# Patient Record
Sex: MALE | Race: WHITE | Employment: FULL TIME | URBAN - NONMETROPOLITAN AREA
[De-identification: names, ages, dates, MRNs, and addresses within clinical notes are randomized per-mention and may not be internally consistent; named-entity substitution may affect disease eponyms.]

---

## 2021-07-12 ENCOUNTER — HOSPITAL ENCOUNTER (OUTPATIENT)
Dept: GENERAL RADIOLOGY | Age: 56
Discharge: HOME | End: 2021-07-12
Payer: COMMERCIAL

## 2021-07-12 ENCOUNTER — HOSPITAL ENCOUNTER (OUTPATIENT)
Age: 56
Discharge: HOME | End: 2021-07-12
Payer: COMMERCIAL

## 2021-07-12 DIAGNOSIS — T14.90XA INJURY: ICD-10-CM

## 2021-07-12 DIAGNOSIS — S93.402A SPRAIN OF LEFT ANKLE, UNSPECIFIED LIGAMENT, INITIAL ENCOUNTER: ICD-10-CM

## 2021-07-12 DIAGNOSIS — S96.912A STRAIN OF LEFT ANKLE, INITIAL ENCOUNTER: ICD-10-CM

## 2021-07-12 PROCEDURE — 73630 X-RAY EXAM OF FOOT: CPT

## 2021-07-12 PROCEDURE — 73610 X-RAY EXAM OF ANKLE: CPT

## 2021-08-23 ENCOUNTER — HOSPITAL ENCOUNTER (OUTPATIENT)
Dept: PHYSICAL THERAPY | Age: 56
Setting detail: THERAPIES SERIES
Discharge: HOME | End: 2021-08-23
Payer: COMMERCIAL

## 2021-08-23 PROCEDURE — 97110 THERAPEUTIC EXERCISES: CPT

## 2021-08-23 PROCEDURE — 97161 PT EVAL LOW COMPLEX 20 MIN: CPT

## 2021-08-23 ASSESSMENT — PAIN DESCRIPTION - ORIENTATION: ORIENTATION: LEFT

## 2021-08-23 ASSESSMENT — PAIN DESCRIPTION - FREQUENCY: FREQUENCY: CONTINUOUS

## 2021-08-23 ASSESSMENT — PAIN DESCRIPTION - DESCRIPTORS: DESCRIPTORS: ACHING

## 2021-08-23 ASSESSMENT — PAIN DESCRIPTION - LOCATION: LOCATION: ANKLE

## 2021-08-23 ASSESSMENT — PAIN SCALES - GENERAL: PAINLEVEL_OUTOF10: 3

## 2021-08-23 ASSESSMENT — PAIN DESCRIPTION - ONSET: ONSET: SUDDEN

## 2021-08-23 ASSESSMENT — PAIN - FUNCTIONAL ASSESSMENT: PAIN_FUNCTIONAL_ASSESSMENT: PREVENTS OR INTERFERES SOME ACTIVE ACTIVITIES AND ADLS

## 2021-08-23 ASSESSMENT — PAIN DESCRIPTION - PROGRESSION: CLINICAL_PROGRESSION: GRADUALLY IMPROVING

## 2021-08-23 ASSESSMENT — PAIN DESCRIPTION - PAIN TYPE: TYPE: ACUTE PAIN

## 2021-08-23 NOTE — PROGRESS NOTES
Physical Therapy  Initial Assessment  Date: 2021  Patient Name: Tete Holman  MRN: [id number]  : 1965     Treatment Diagnosis: S93.402A/S96.912 A    Restrictions  Position Activity Restriction  Other position/activity restrictions: none    Subjective   General  Chart Reviewed: Yes  Patient assessed for rehabilitation services?: Yes  Family / Caregiver Present: No  Referring Practitioner: Ary Trejo  Diagnosis: S93.402A/S96.912 A  Follows Commands: Within Functional Limits  PT Visit Information  PT Insurance Information: [facility] 10 sessions by 21  Subjective  Subjective: injured L ankle 21- inversion sprain- no prior ankle injuries - most difficulty with prolonged standing - has been taken off restrictions- wearing ankle brace; patient estimates that he is  little above of 50% of normal funciton  Pain Screening  Patient Currently in Pain: Yes  Pain Assessment  Pain Assessment: 0-10  Pain Level: 3 (max pain 8/10 with)  Pain Type: Acute pain  Pain Location: Ankle  Pain Orientation: Left  Pain Descriptors: Aching  Pain Frequency: Continuous  Pain Onset: Sudden  Clinical Progression: Gradually improving  Functional Pain Assessment: Prevents or interferes some active activities and ADLs  Vital Signs  Patient Currently in Pain: Yes    Vision/Hearing  Vision  Vision:  (glasses)  Hearing  Hearing: Within functional limits    Orientation  Orientation  Overall Orientation Status: Within Normal Limits    Social/Functional History  Social/Functional History  Home Layout: One level  Home Access: Level entry  ADL Assistance: Independent  Homemaking Assistance: Independent  Ambulation Assistance: Independent  Transfer Assistance: Independent  Active : Yes  Mode of Transportation: Car  Type of occupation: [employer]    Objective     Observation/Palpation  Palpation: L lateral ankle TTP    AROM LLE (degrees)  LLE AROM : WFL  LLE General AROM: ankle AROM painful    Strength LLE  Strength LLE: Exception  Comment: unable to perform single L leg heel up- resisted ankle inversion/eversion painful  L Ankle Plantar Flexion: At least;2+/5                      Ambulation  Ambulation?: Yes  Ambulation 1  Quality of Gait: antalgic gait                            Assessment   Conditions Requiring Skilled Therapeutic Intervention  Body structures, Functions, Activity limitations: Decreased ROM; Increased pain  Assessment: 10 sec TUG, 10 STS, 49/80 LEFS  Treatment Diagnosis: S93.402A/S96.912 A  Prognosis: Good  Decision Making: Low Complexity  History: np PF  Exam: 10 sec TUG, 10 STS, 49/80 LEFS  Clinical Presentation: changing characteristics of function due to pain  Barriers to Learning: none         Plan        G-Code       OutComes Score                                                  AM-PAC Score             Goals  Long term goals  Time Frame for Long term goals : 10 sessions  Long term goal 1: patiejnt will perform 10 L leg heel ups with no greater than 3/10 pain  Long term goal 2: 57/80 LEFS  Long term goal 3: patient will be indpendent with HEP       Therapy Time   Individual Concurrent Group Co-treatment   Time In 0825         Time Out 0905         Minutes 40         Timed Code Treatment Minutes: 20 Minutes       KATIUSKA Berger, PT

## 2021-08-23 NOTE — FLOWSHEET NOTE
Outpatient Physical Therapy  [place]           [] Phone: [phone]   Fax: [phone]  [place] [place]           [x] Phone: [phone]   Fax: [phone]        Physical Therapy Daily Treatment Note  Date:  2021    Patient Name:  Sujata Almanza    :  1965  MRN: [id number]  Restrictions/Precautions:  Other position/activity restrictions: none  Diagnosis:   Diagnosis: S93.402A/S96.912 A  Date of Injury/Surgery:   Treatment Diagnosis: Treatment Diagnosis: S93.402A/S96.912 A    Insurance/Certification information: PT Insurance Information: [facility] 10 sessions by 21   Referring Physician:  Referring Practitioner: Fransisca Willoughby Doctor Visit:    Plan of care signed (Y/N):    Outcome Measure: 10 sec TUG, 10 STS, 49/80 LEFS  Visit# / total visits:  1 /10  Pain level: 3/10 L ankle pain @ time of eval  Goals:           Long term goals  Time Frame for Long term goals : 10 sessions  Long term goal 1: patiejnt will perform 10 L leg heel ups with no greater than 3/10 pain  Long term goal 2: 57/80 LEFS  Long term goal 3: patient will be indpendent with HEP    ASSESSMENT  Patient primary complaints: L ankle pain  History of condition:injured L ankle 21- inversion sprain- no prior ankle injuries - has been taken off restrictions- wearing ankle brace; patient estimates that he is  little above of 50% of normal funciton  Current functional limitations: most difficulty with prolonged standing   Clinical findings:10 sec TUG, 10 STS, 49/80 LEFS; pain with L heel up  PLOF:function not limited by L ankle pain prior to injury  Skilled PT interventions are intended to: decrease pain which will enable patient  to return to PLOF  Patient agrees with established plan of care and assisted in the development of their  goals  Barriers to learning:none- no mental/cognitive barriers observed  Preferred learning style(s):   written- demonstration -practice  Preferred Language: English  Potential barriers to progress:none  The patient appears motivated to participate in PT regain PLOF: yes      Subjective:  See eval         Any changes in Ambulatory Summary Sheet? None        Objective:  See eval   COVID screening questions were asked and patient attested that there had been no contact or symptoms        Exercises: (No more than 4 columns)   Exercise/Equipment Date 8/23/21 Date Date      PT eval/ HEP instruct     WARM UP         nustep  start          TABLE      Ankle pumps  5 ct hold ea direction HEP    Calf flexibility Towel stretch HEP    ankle circles CW/CCW HEP                   STANDING      Slant board 1. 5'     Rocker board 1.5' ea - balance and FWD/BWD  taps     Shuttle-  Trial DL heel ups    BAPS board  Trial     Total  gym  trial                     PROPRIOCEPTION                                    MODALITIES                      Other Therapeutic Activities/Education:        Home Exercise Program:  Towel stretch/ankle pumps/circles patient expressed and demonstrated understanding provided with handout      Manual Treatments:        Modalities:        Communication with other providers:        Assessment:  (Response towards treatment session) (Pain Rating)  Pt tolerated  treatment without any adverse reactions or complications this date.  . Pt would continue to benefit from skilled therapy interventions to address remaining impairments, improve mobility and strength,  and progress toward goal completion and prepare for d/c including finalizing HEP ;    Pain complaints after session 3/10  L ankle      Plan for Next Session:        Time In / Time Out:0825/0905           If [facility] Please Indicate Time In/Out/Total Time  CPT Code Time in Time out Total Time   PT eval   0825  0845  20   TE  0845  0905  20                                          Total for session      40       Timed Code/Total Treatment Minutes:  TE x1/ PT eval      Next Progress Note due:        Plan of Care Interventions:  [x] Therapeutic Exercise  [] Modalities:  [x] Therapeutic Activity     [] Ultrasound  [] Estim  [x] Gait Training      [] Cervical Traction [] Lumbar Traction  [x] Neuromuscular Re-education    [] Cold/hotpack [] Iontophoresis   [x] Instruction in HEP      [x] Vasopneumatic   [] Dry Needling    [x] Manual Therapy               [] Aquatic Therapy              Electronically signed by:  Ciarra Christensen PT, 8/23/2021, 9:31 AM

## 2021-08-23 NOTE — PLAN OF CARE
Outpatient Physical Therapy           [place]           [] Phone: [phone]   Fax: [phone]  [place]           [x] Phone: [phone]   Fax: [phone]     To: Referring Practitioner: Adelso Faustin    From: Murray Gilliam PT, PT     Patient: Landen Carrasco       : 1965  Diagnosis: Diagnosis: S93.402A/S96.912 A   Treatment Diagnosis: Treatment Diagnosis: S93.402A/S96.912 A   Date: 2021    Physical Therapy Certification/Re-Certification Form    The following patient has been evaluated for physical therapy services and for therapy to continue, insurance requires physician review of the treatment plan initially and every 90 days. Please review the attached evaluation and/or summary of the patient's plan of care, and verify that you agree therapy should continue by signing the attached document and sending it back to our office.     Patient primary complaints: L ankle pain  History of condition:injured L ankle 21- inversion sprain- no prior ankle injuries - has been taken off restrictions- wearing ankle brace; patient estimates that he is  little above of 50% of normal funciton  Current functional limitations: most difficulty with prolonged standing   Clinical findings:10 sec TUG, 10 STS, 49/80 LEFS; pain with L heel up  PLOF:function not limited by L ankle pain prior to injury  Skilled PT interventions are intended to: decrease pain which will enable patient  to return to PLOF  Patient agrees with established plan of care and assisted in the development of their  goals  Barriers to learning:none- no mental/cognitive barriers observed  Preferred learning style(s):   written- demonstration -practice  Preferred Language: English  Potential barriers to progress:none  The patient appears motivated to participate in PT regain PLOF: yes    Plan of Care/Treatment to date:  [x] Therapeutic Exercise  [] Modalities:  [x] Therapeutic Activity     [] Ultrasound  [] Electrical Stimulation  [x] Gait Training      [] Cervical Traction [] Lumbar Traction  [x] Neuromuscular Re-education    [] Cold/hotpack [] Iontophoresis   [x] Instruction in HEP      [x] Vasopneumatic    [] Dry Needling  [x] Manual Therapy               [] Aquatic Therapy     Frequency/Duration:  # Days per week: [] 1 day # Weeks: [x] 10 sessions [] 5 weeks     [x] 2 days   [] 2 weeks [] 6 weeks     [x] 3 days   [] 3 weeks [] 7 weeks     [] 4 days   [] 4 weeks [] 8 weeks         [] 9 weeks [] 10 weeks         [] 11 weeks [] 12 weeks  Rehab Potential/Progress: [] Excellent [x] Good [] Fair  [] Poor   Goals:          Long term goals  Time Frame for Long term goals : 10 sessions  Long term goal 1: patiejnt will perform 10 L leg heel ups with no greater than 3/10 pain  Long term goal 2: 57/80 LEFS  Long term goal 3: patient will be indpendent with HEP  Electronically signed by:  Renard Griffin PT, PT, 8/23/2021, 9:31 AM  If you have any questions or concerns, please don't hesitate to call.   Thank you for your referral.      Physician Signature:________________________________Date:_________ TIME: _____  By signing above, therapists plan is approved by physician

## 2021-08-25 ENCOUNTER — HOSPITAL ENCOUNTER (OUTPATIENT)
Dept: PHYSICAL THERAPY | Age: 56
Setting detail: THERAPIES SERIES
Discharge: HOME | End: 2021-08-25
Payer: COMMERCIAL

## 2021-08-25 PROCEDURE — 97016 VASOPNEUMATIC DEVICE THERAPY: CPT

## 2021-08-25 PROCEDURE — 97110 THERAPEUTIC EXERCISES: CPT

## 2021-08-25 NOTE — FLOWSHEET NOTE
Outpatient Physical Therapy  [place]           [] Phone: [phone]   Fax: [phone]  [facility]           [x] Phone: [phone]   Fax: [phone]        Physical Therapy Daily Treatment Note  Date:  2021    Patient Name:  Booker Solis    :  1965  MRN: [id number]  Restrictions/Precautions:  Other position/activity restrictions: none  Diagnosis:   Diagnosis: S93.402A/S96.912 A  Date of Injury/Surgery:   Treatment Diagnosis: Treatment Diagnosis: S93.402A/S96.912 A    Insurance/Certification information: PT Insurance Information: [facility] 10 sessions by 21   Referring Physician:  Referring Practitioner: Kelly Martines  Next Doctor Visit:    Plan of care signed (Y/N):    Outcome Measure: 10 sec TUG, 10 STS, 49/80 LEFS  Visit# / total visits:  2 /10  Pain level: 5/10 L ankle pain - max intensity over past 24 hours  Goals:           Long term goals  Time Frame for Long term goals : 10 sessions  Long term goal 1: patiejnt will perform 10 L leg heel ups with no greater than 3/10 pain  Long term goal 2: 57/80 LEFS  Long term goal 3: patient will be indpendent with HEP    ASSESSMENT  Patient primary complaints: L ankle pain  History of condition:injured L ankle 21- inversion sprain- no prior ankle injuries - has been taken off restrictions- wearing ankle brace; patient estimates that he is  little above of 50% of normal funciton  Current functional limitations: most difficulty with prolonged standing   Clinical findings:10 sec TUG, 10 STS, 49/80 LEFS; pain with L heel up  PLOF:function not limited by L ankle pain prior to injury  Skilled PT interventions are intended to: decrease pain which will enable patient  to return to PLOF  Patient agrees with established plan of care and assisted in the development of their  goals  Barriers to learning:none- no mental/cognitive barriers observed  Preferred learning style(s):   written- demonstration -practice  Preferred Language: English  Potential barriers to progress:none  The patient appears motivated to participate in PT regain PLOF: yes      Subjective:patient reports ankle pain  more intense @ end of work shift        Any changes in Ambulatory Summary Sheet? None        Objective:antalgic gait  COVID screening questions were asked and patient attested that there had been no contact or symptoms        Exercises: (No more than 4 columns)   Exercise/Equipment Date 8/23/21 Date 8/25/21 Date      PT eval/ HEP instruct     WARM UP         nustep  Seat 8/arms 9, load 3 x10'- focus on calf stretch          TABLE      Ankle pumps  5 ct hold ea direction HEP    Calf flexibility Towel stretch HEP    ankle circles CW/CCW HEP                   STANDING      Slant board 1.5' 1.5'    Rocker board 1.5' ea - balance and FWD/BWD  taps 2' ea balance and FWD/BWD  tap    Shuttle-   DL heel ups  1 bottom black cord 3 x10    BAPS board  Standing L2 1' CW/CCW    Total  gym  #8 DL squats as able 3 x10    FWD step ups  6\" step L foot stays on step - step up/down with R foot - minimal UE support    3x10               PROPRIOCEPTION                                    MODALITIES  See below                    Other Therapeutic Activities/Education:        Home Exercise Program:  Towel stretch/ankle pumps/circles patient expressed and demonstrated understanding provided with handout      Manual Treatments:        Modalities:  Patient received vasocompression on their L ankle  for pain and inflammation for 15 min on low pressure. Patient had negative skin reaction afterwards. Communication with other providers:        Assessment:  (Response towards treatment session) (Pain Rating)  Pt tolerated  treatment without any adverse reactions or complications this date.  . Pt would continue to benefit from skilled therapy interventions to address remaining impairments, improve mobility and strength,  and progress toward goal completion and prepare for d/c including finalizing HEP ; Pain complaints after session 3/10  L ankle      Plan for Next Session:        Time In / Time LHF:2951/6321          If [facility] Please Indicate Time In/Out/Total Time  CPT Code Time in Time out Total Time           TE  0905 0943 38   vaso  0943  0958  15                                 Total for session      53       Timed Code/Total Treatment Minutes:        Next Progress Note due:        Plan of Care Interventions:  [x] Therapeutic Exercise  [] Modalities:  [x] Therapeutic Activity     [] Ultrasound  [] Estim  [x] Gait Training      [] Cervical Traction [] Lumbar Traction  [x] Neuromuscular Re-education    [] Cold/hotpack [] Iontophoresis   [x] Instruction in HEP      [x] Vasopneumatic   [] Dry Needling    [x] Manual Therapy               [] Aquatic Therapy              Electronically signed by:  Maria G Padilla PT, 8/25/2021, 9:07 AM

## 2021-09-10 NOTE — DISCHARGE SUMMARY
Outpatient Physical Therapy           [place]           [] Phone: [phone]   Fax: [phone]  [place]           [x] Phone: [phone]   Fax: [phone]      To: Referring Practitioner: Rosette Vazquez    From: Kiko Moran PT,     Patient: Jordy Liu                  : 1965  Diagnosis:  Diagnosis: S93.402A/S96.912 A      Date: 9/10/2021  Treatment Diagnosis: Treatment Diagnosis: S93.402A/S96.912 A       []  Progress Note                [x]  Discharge Note    Evaluation Date: 21    Total Visits to date:  2 planned for up to 10Cancels/No-shows to date:      Subjective: 21   Patient arrived at  [id number] for 0930 scheduled appointment. Stated that he was a little late because he overslept. Was informed by front office staff that it was okay that we could still treat him. Patient then stated that he would like to cancel his remaining scheduled appointments because of his work schedule and concerns that he will oversleep and miss his appointments.       Plan of Care/Treatment to date:  [x] Therapeutic Exercise    [] Modalities:  [x] Therapeutic Activity     [] Ultrasound  [] Electrical Stimulation  [x] Gait Training      [] Cervical Traction   [] Lumbar Traction  [x] Neuromuscular Re-education  [] Cold/hotpack [] Iontophoresis  [x] Instruction in HEP      Other:  [x] Manual Therapy       [x]  Vasopneumatic  [] Aquatic Therapy       []   Dry Needle Therapy                      Objective/Significant Findings At Last Visit/Comments:  antalgic gait          Goal Status:  [] Achieved [] Partially Achieved  [x] Not Achieved             Long term goals  Time Frame for Long term goals : 10 sessions  Long term goal 1: patiejnt will perform 10 L leg heel ups with no greater than 3/10 pain  Long term goal 2: 57/80 LEFS  Long term goal 3: patient will be indpendent with HEP         [x] Patient now discharged- per patient request    Electronically signed by:  Kiko Moran PT, , 9/10/2021, 10:19 AM    If

## 2023-03-02 ENCOUNTER — HOSPITAL ENCOUNTER (OUTPATIENT)
Dept: SLEEP CENTER | Age: 58
Discharge: HOME | End: 2023-03-02
Payer: COMMERCIAL

## 2023-03-02 VITALS
HEIGHT: 65 IN | SYSTOLIC BLOOD PRESSURE: 179 MMHG | OXYGEN SATURATION: 98 % | BODY MASS INDEX: 30.82 KG/M2 | WEIGHT: 185 LBS | HEART RATE: 65 BPM | DIASTOLIC BLOOD PRESSURE: 98 MMHG

## 2023-03-02 DIAGNOSIS — G47.10 HYPERSOMNIA: ICD-10-CM

## 2023-03-02 DIAGNOSIS — G47.33 OSA (OBSTRUCTIVE SLEEP APNEA): ICD-10-CM

## 2023-03-02 DIAGNOSIS — E66.9 OBESITY (BMI 30-39.9): ICD-10-CM

## 2023-03-02 PROCEDURE — 99211 OFF/OP EST MAY X REQ PHY/QHP: CPT

## 2023-03-02 PROCEDURE — 99204 OFFICE O/P NEW MOD 45 MIN: CPT | Performed by: INTERNAL MEDICINE

## 2023-03-02 RX ORDER — GLYBURIDE 5 MG/1
TABLET ORAL
COMMUNITY
Start: 2023-01-31

## 2023-03-02 RX ORDER — LISINOPRIL 10 MG/1
TABLET ORAL
COMMUNITY
Start: 2023-01-31

## 2023-03-02 ASSESSMENT — SLEEP AND FATIGUE QUESTIONNAIRES
HOW LIKELY ARE YOU TO NOD OFF OR FALL ASLEEP WHILE LYING DOWN TO REST IN THE AFTERNOON WHEN CIRCUMSTANCES PERMIT: 0
HOW LIKELY ARE YOU TO NOD OFF OR FALL ASLEEP WHILE WATCHING TV: 1
HOW LIKELY ARE YOU TO NOD OFF OR FALL ASLEEP IN A CAR, WHILE STOPPED FOR A FEW MINUTES IN TRAFFIC: 1
HOW LIKELY ARE YOU TO NOD OFF OR FALL ASLEEP WHILE SITTING AND TALKING TO SOMEONE: 0
HOW LIKELY ARE YOU TO NOD OFF OR FALL ASLEEP WHILE SITTING INACTIVE IN A PUBLIC PLACE: 0
HOW LIKELY ARE YOU TO NOD OFF OR FALL ASLEEP WHILE SITTING QUIETLY AFTER LUNCH WITHOUT ALCOHOL: 1
ESS TOTAL SCORE: 5
HOW LIKELY ARE YOU TO NOD OFF OR FALL ASLEEP WHEN YOU ARE A PASSENGER IN A CAR FOR AN HOUR WITHOUT A BREAK: 1
HOW LIKELY ARE YOU TO NOD OFF OR FALL ASLEEP WHILE SITTING AND READING: 1

## 2023-03-02 NOTE — CONSULTS
Janelle Aldrich MD, Danny Smith MD, Va Wahl MD, [facility]      [address], [address] [facility]   PH: [phone]  F: [phone]     Subjective:     Patient ID: Hafsa Carmona is a 59 y.o. male, referred to the sleep center for   Chief Complaint   Patient presents with    Other     Idiopathic sleep related nonobstructive alveolar hypoventilation   . Referring physician:  Chhaya Jade MD     History:has been referred for the JEWELS. He was diagnosed with JEWELS in 2013 and was on the CPAP of 15 cm h20 till 3 months ago when it broke, He was not followed by a sleep specialist till now.      Symptoms:   []  Snoring                                                                    [x]  Dry Mouth  []  Choking                                                                   []  Morning Headaches  []  Gasping for Air                                                        []  Trouble Falling asleep  [x]  Tired during the daytime                                         []  Trouble Staying Asleep  [x]  Tired when you wake up                                         [x]  Weight Gain in Last 5 Years  []  Wake up frequently at night                                    []  Weight Loss in Last 5 Years  []  Shortness Of Breath                                               [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  []  Coughing                                                                []  Smoker (Previous or Current)  []  Chest Pain                                                              []  Anxiety  []  Trouble keeping your legs still at night                   []  Depression  []  Kicking your legs in your sleep                               []  Insomnia     [] Palpitation  [x]   Stop breathing      []  Other:     Significant Co-morbidities:  []  Congestive Heart Failure     []  COPD         []  Stroke (Past 30 Days)      []  Supplemental Oxygen Usage       []  Cognitive Impairment      []  Neuromuscular Problems  []  Epilepsy/Neurological Disorders           Social History     Socioeconomic History    Marital status:      Spouse name: Not on file    Number of children: Not on file    Years of education: Not on file    Highest education level: Not on file   Occupational History    Not on file   Tobacco Use    Smoking status: Never    Smokeless tobacco: Never   Vaping Use    Vaping Use: Never used   Substance and Sexual Activity    Alcohol use: No    Drug use: No    Sexual activity: Not on file   Other Topics Concern    Not on file   Social History Narrative    Not on file     Social Determinants of Health     Financial Resource Strain: Not on file   Food Insecurity: Not on file   Transportation Needs: Not on file   Physical Activity: Not on file   Stress: Not on file   Social Connections: Not on file   Intimate Partner Violence: Not on file   Housing Stability: Not on file       Prior to Admission medications    Medication Sig Start Date End Date Taking?  Authorizing Provider   glyBURIDE (DIABETA) 5 MG tablet TAKE 1 TABLET BY MOUTH ONCE DAILY 1/31/23  Yes Historical Provider, MD   lisinopril (PRINIVIL;ZESTRIL) 10 MG tablet TAKE 1 TABLET BY MOUTH ONCE DAILY 1/31/23  Yes Historical Provider, MD   metFORMIN (GLUCOPHAGE) 850 MG tablet Take 850 mg by mouth 2 times daily (with meals) 10/5/21  Yes Historical Provider, MD   HYDROcodone-acetaminophen (NORCO) 5-325 MG per tablet Take 1-2 tablets by mouth every 6 hours as needed for Pain 10/8/16  Yes Yessica Che MD   ondansetron (ZOFRAN ODT) 4 MG disintegrating tablet Take 1 tablet by mouth every 8 hours as needed for Nausea 10/8/16  Yes Yessica Che MD   ondansetron (ZOFRAN ODT) 4 MG disintegrating tablet Take 1 tablet by mouth every 8 hours as needed for Nausea 10/8/16  Yes Yessica Che MD   montelukast (SINGULAIR) 10 MG tablet Take 10 mg by mouth nightly   Yes Historical Provider, MD   pravastatin (PRAVACHOL) 80 MG tablet Take 80 mg by mouth daily   Yes Historical Provider, MD   pantoprazole (PROTONIX) 40 MG tablet Take 40 mg by mouth daily   Yes Historical Provider, MD   loratadine (CLARITIN) 10 MG capsule Take by mouth daily   Yes Historical Provider, MD   DiphenhydrAMINE HCl (ALLERGY MEDICATION PO) Take by mouth   Yes Historical Provider, MD   Nutritional Supplements (CHOLESTEROL DEFENSE PO) Take by mouth   Yes Historical Provider, MD   tamsulosin (FLOMAX) 0.4 MG capsule Take 1 capsule by mouth daily for 10 doses 10/8/16 10/18/16  Adriana Murdock MD   ibuprofen (ADVIL;MOTRIN) 600 MG tablet Take 1 tablet by mouth every 6 hours as needed for Pain  Patient not taking: Reported on 3/2/2023 10/8/16   Adriana Murdock MD   ibuprofen (ADVIL;MOTRIN) 600 MG tablet Take 1 tablet by mouth every 6 hours as needed for Pain  Patient not taking: Reported on 3/2/2023 10/8/16   Adriana Murdock MD   tamsulosin [facility]) 0.4 MG capsule Take 1 capsule by mouth daily for 10 doses 10/8/16 10/18/16  Adriana Murdock MD       Allergies as of 03/02/2023 - Fully Reviewed 03/02/2023   Allergen Reaction Noted    Dust mite extract  09/12/2016    Other  09/12/2016       Patient Active Problem List   Diagnosis    JEWELS (obstructive sleep apnea)    Hypersomnia    Obesity (BMI 30-39. 9)       Past Medical History:   Diagnosis Date    Arthritis     Diabetes mellitus ([address]     History of kidney stones     \"back in early 2000's had surgery to remove stone\"    Hyperlipidemia     Hypertension     Kidney stone     Sleep apnea     wears cpap at night/ had sleep study 2014       Past Surgical History:   Procedure Laterality Date    CHOLECYSTECTOMY      CYSTOSCOPY  early 2000's    cysto with stone manipulation    HERNIA REPAIR  9896    umbilical hernia repair with mesh    OTHER SURGICAL HISTORY  09/13/2016    robotic assist lap ventral hernia repair with mesh       Family History   Problem Relation Age of Onset    Diabetes Mother     High Blood Pressure Mother Asthma Father         copd    Heart Disease Father          Objective:   BP (!) 179/98   Pulse 65   Ht 5' 5\" (1.651 m)   Wt 185 lb (83.9 kg)   SpO2 98%   BMI 30.79 kg/m²   Body mass index is 30.79 kg/m². Sleep Medicine 3/2/2023   Sitting and reading 1   Watching TV 1   Sitting, inactive in a public place (e.g. a theatre or a meeting) 0   As a passenger in a car for an hour without a break 1   Lying down to rest in the afternoon when circumstances permit 0   Sitting and talking to someone 0   Sitting quietly after a lunch without alcohol 1   In a car, while stopped for a few minutes in traffic 1   [place] Sleepiness Score 5   Neck circumference (Inches) 16.75     Mallampati 3    Vitals:    03/02/23 0911   BP: (!) 179/98   Pulse: 65   SpO2: 98%   Weight: 185 lb (83.9 kg)   Height: 5' 5\" (1.651 m)     Neck circumference (Inches): 16.75  Inches  [place] Sleepiness Score: 5    Gen: No distress. Eyes: PERRL. No sclera icterus. No conjunctival injection. ENT: No discharge. Pharynx clear. External appearance of ears and nose normal.  Neck: Trachea midline. No obvious mass. Resp: No accessory muscle use. No crackles. No wheezes. No rhonchi. No dullness on percussion. CV: Regular rate. Regular rhythm. No murmur or rub. No edema. GI: Non-tender. Non-distended. No hernia. Skin: Warm, dry, normal texture and turgor. No nodule on exposed extremities. Lymph: No cervical LAD. No supraclavicular LAD. M/S: No cyanosis. No clubbing. No joint deformity. Psych: Oriented x 3. No anxiety. Awake. Alert. Intact judgement and insight.     Mallampati Airway Classification:   []1 []2 [x]3 []4        Assessment and Plan     Diagnosis:    Problem List          Respiratory    JEWELS (obstructive sleep apnea)      He has symptoms of JEWELS  Advised to go for the split night study  Loose weight         Relevant Orders    Sleep Study with PAP Titration       Other    Hypersomnia      He has symptoms of JEWELS  Advised to go for the split night study  Loose weight           Obesity (BMI 30-39. 9)      Advised to loose weight with diet and exercise           Relevant Medications    glyBURIDE (DIABETA) 5 MG tablet    metFORMIN (GLUCOPHAGE) 850 MG tablet             Additional Plan:     [x]  Sleep hygiene/ relaxation methods & CBTi principles review with patient   [x]  Avoid supine/back sleep until sleep study   [x]  Driving precautions   [x]  Medical consequences of untreated JEWELS   [x]  Weight loss recommendations   [x]  Diet recommendations   [x]  Exercise   []  Advised to quit smoking       []  PFT referral   []  Bariatric Program referral      Follow-Up:    No follow-ups on file.     Electronically signed by Marcy Blake MD on 3/2/2023 at 9:35 AM

## 2023-04-02 ENCOUNTER — HOSPITAL ENCOUNTER (OUTPATIENT)
Dept: SLEEP CENTER | Age: 58
Discharge: HOME | End: 2023-04-02
Payer: COMMERCIAL

## 2023-04-02 VITALS — HEIGHT: 65 IN | WEIGHT: 185 LBS | BODY MASS INDEX: 30.82 KG/M2

## 2023-04-02 DIAGNOSIS — G47.33 OSA (OBSTRUCTIVE SLEEP APNEA): ICD-10-CM

## 2023-04-02 PROCEDURE — 95811 POLYSOM 6/>YRS CPAP 4/> PARM: CPT

## 2023-04-03 NOTE — PROGRESS NOTES
4/3/2023  sleep study  for Carlo Latham  1965 is complete. Results are pending physician review.     Electronically signed by Farhana Cohn on 4/3/2023 at 3:47 AM

## 2023-04-05 PROCEDURE — 95811 POLYSOM 6/>YRS CPAP 4/> PARM: CPT | Performed by: INTERNAL MEDICINE

## 2023-05-31 ENCOUNTER — HOSPITAL ENCOUNTER (OUTPATIENT)
Dept: SLEEP CENTER | Age: 58
Discharge: HOME | End: 2023-05-31
Payer: COMMERCIAL

## 2023-05-31 DIAGNOSIS — E66.9 OBESITY (BMI 30-39.9): ICD-10-CM

## 2023-05-31 DIAGNOSIS — G47.10 HYPERSOMNIA: ICD-10-CM

## 2023-05-31 DIAGNOSIS — G47.33 OSA (OBSTRUCTIVE SLEEP APNEA): ICD-10-CM

## 2023-05-31 PROCEDURE — 99214 OFFICE O/P EST MOD 30 MIN: CPT | Performed by: INTERNAL MEDICINE

## 2023-05-31 PROCEDURE — 9990000010 HC NO CHARGE VISIT

## 2023-05-31 ASSESSMENT — ENCOUNTER SYMPTOMS
COUGH: 0
ABDOMINAL PAIN: 0
BACK PAIN: 0
EYE ITCHING: 0
ABDOMINAL DISTENTION: 0
EYE DISCHARGE: 0
SHORTNESS OF BREATH: 0

## 2023-05-31 NOTE — ASSESSMENT & PLAN NOTE
He has very severe JEWELS requiring a CPAP of 20 cm h20  Advised to be compliant with it  Loose weight  Need 2 week download data

## 2023-05-31 NOTE — PROGRESS NOTES
for Nausea (Patient not taking: Reported on 5/31/2023) 15 tablet 0    tamsulosin (FLOMAX) 0.4 MG capsule Take 1 capsule by mouth daily for 10 doses 10 capsule 3     No current facility-administered medications for this encounter. Allergies   Allergen Reactions    Dust Mite Extract     Other      Pollen dust, mold allergies       Past Medical History:   Diagnosis Date    Arthritis     Di[address] mellitus ([facility] [address]     History of kidney stones     \"back in early 2000's had surgery to remove stone\"    Hyperlipidemia     Hypertension     Kidney stone     Sleep apnea     wears cpap at night/ had sleep study 2014       Past Surgical History:   Procedure Laterality Date    CHOLECYSTECTOMY      CYSTOSCOPY  early 2000's    cysto with stone manipulation    HERNIA REPAIR  9174    umbilical hernia repair with mesh    OTHER SURGICAL HISTORY  09/13/2016    robotic assist lap ventral hernia repair with mesh       Social History     Socioeconomic History    Marital status:    Tobacco Use    Smoking status: Never    Smokeless tobacco: Never   Vaping Use    Vaping Use: Never used   Substance and Sexual Activity    Alcohol use: No    Drug use: No       Review of Systems   Constitutional:  Positive for fatigue. HENT:  Negative for congestion and postnasal drip. Eyes:  Negative for discharge and itching. Respiratory:  Negative for cough and shortness of breath. Cardiovascular:  Negative for chest pain and leg swelling. Gastrointestinal:  Negative for abdominal distention and abdominal pain. Endocrine: Negative for cold intolerance and heat intolerance. Genitourinary:  Negative for enuresis and frequency. Musculoskeletal:  Negative for arthralgias and back pain. Allergic/Immunologic: Negative for environmental allergies and food allergies. Neurological:  Negative for light-headedness and headaches. Hematological:  Negative for adenopathy.    Psychiatric/Behavioral:  Negative for agitation and behavioral